# Patient Record
Sex: MALE | Race: OTHER | Employment: OTHER | URBAN - METROPOLITAN AREA
[De-identification: names, ages, dates, MRNs, and addresses within clinical notes are randomized per-mention and may not be internally consistent; named-entity substitution may affect disease eponyms.]

---

## 2018-05-22 NOTE — PATIENT DISCUSSION
stressed to pt that ERM is cause of Slight Distortion in vision. had long disc. with pt in regards to cat sx having no effect on ERM, meaning distortion will still be there after sx.

## 2018-08-07 NOTE — PATIENT DISCUSSION
Dryness well controlled today. Nerve overstimulation likely fb cause. OK to try Prolensa qd OS. Sample  dispensed today.

## 2018-10-09 NOTE — PROCEDURE NOTE: CLINICAL
PROCEDURE NOTE: Punctal Plugs, Silicone #2 Bilateral Lower Lids. Diagnosis: Dry Eye Syndrome. Prior to treatment, the risks/benefits/alternatives were discussed. The patient wished to proceed with procedure. Permanent silicone plugs were inserted. Patient tolerated procedure well. There were no complications. Post procedure instructions given. Size *. Priti Carrasquillo

## 2019-01-17 NOTE — PATIENT DISCUSSION
Explained to patient that distance and near are both usually good with monovision.  Intermediate/Computer distance is usually the week area. Patient understands.

## 2019-03-27 NOTE — PROCEDURE NOTE: CLINICAL
PROCEDURE NOTE: Epilation Right Lower Lid. Diagnosis: Trichiasis without Entropion. Anesthesia: Topical. Prep: Betadine Flush. Prior to treatment, the risks/benefits/alternatives were discussed. The patient wished to proceed with procedure. Ingrown eyelash removed from right lower eyelid after eyelid was anesthetized with 1% Lido w/epi using a 27g needle and Jeweler forceps at the slit lamp. Patient tolerated procedure well. There were no complications. Bruce Whatley

## 2019-03-27 NOTE — PATIENT DISCUSSION
Discussed removal of ingrown/embedded lash from lid margin.  Patient desires to proceed today.  See procedure note for details.  Pasquale urvashi BID RLL until healed.  Follow up prn.

## 2020-01-17 NOTE — PATIENT DISCUSSION
Distance Rx was cut back on purpose in order to equalize the visual acuity between the two eyes at distance.

## 2020-01-17 NOTE — PATIENT DISCUSSION
Patient explained that BCVA is compromised OD due to ERM.   OD will not be as sharp as OS with Distance glasses.

## 2020-02-06 ENCOUNTER — NEW PATIENT COMPREHENSIVE (OUTPATIENT)
Dept: URBAN - METROPOLITAN AREA CLINIC 39 | Facility: CLINIC | Age: 40
End: 2020-02-06

## 2020-02-06 DIAGNOSIS — H52.11: ICD-10-CM

## 2020-02-06 DIAGNOSIS — H50.00: ICD-10-CM

## 2020-02-06 DIAGNOSIS — H52.203: ICD-10-CM

## 2020-02-06 DIAGNOSIS — H52.4: ICD-10-CM

## 2020-02-06 PROCEDURE — 92004 COMPRE OPH EXAM NEW PT 1/>: CPT

## 2020-02-06 PROCEDURE — 92015 DETERMINE REFRACTIVE STATE: CPT

## 2020-02-06 ASSESSMENT — VISUAL ACUITY
OD_SC: J1+
OU_SC: J1+
OS_SC: J1
OU_SC: 20/20+2
OD_SC: 20/20-1
OS_SC: 20/20-1

## 2020-02-06 ASSESSMENT — TONOMETRY
OD_IOP_MMHG: 15
OS_IOP_MMHG: 16

## 2020-02-06 ASSESSMENT — KERATOMETRY
OD_K2POWER_DIOPTERS: 43.5
OS_AXISANGLE2_DEGREES: 90
OS_K2POWER_DIOPTERS: 43
OD_K1POWER_DIOPTERS: 43
OD_AXISANGLE_DEGREES: 161
OS_K1POWER_DIOPTERS: 43
OD_AXISANGLE2_DEGREES: 71
OS_AXISANGLE_DEGREES: 0